# Patient Record
(demographics unavailable — no encounter records)

---

## 2025-07-09 NOTE — HISTORY OF PRESENT ILLNESS
[No Feeding Issues] : no feeding issues at this time [Solid Foods] : eating solid foods [de-identified] : Maureen is presenting today for initial outpatient evaluation of pancytopenia  In early June he was dx with lymphadenitis and pharyngitis and completed a 10d course of amoxicillin He began having fevers 10days post-amox on 6/25 and his PCP sent him to [facility]. In the ER he was noted to be pancytopenic WBC 2.71 RBC 4.07 Hgb 10.6 plts 125K retic 0.5%.  Admitted on 6/26 due to febrile pancytopenia. While inpatient UA neg, RVP neg, parvo neg, EBV neg, CMV neg, BCX neg, malaria neg. Started on IV cefepime d/t febrile neutropenia. Clean catch urine positive for 10K-49K e.coli, no additional abx added. No differential done initially, but ANC noted to be 900 on 6/29, by this time plts had recovered to 176K but Hgb, RBC, WBC were all still low. Had a US of the head/neck d/t palpable left cervical lymph node and was noted be enlarged with normal morphology. Flow cytometry sent on peripheral blood, showed T cells with normal CD4 to CD8 ratio and polytypic B cells with no increase in myeloid immaturity with normal myeloid antigen maturation pattern. Flow also showed mild pancytopenia and neutropenia with no significant absolute lymphocytosis or monocytosis. Prior to d/c his counts were Hgb 10.6, RBC 3.99, WBC 2.34, plts 171K, .   Mom says Maureen had a very similar event occur in 2019. He had pharyngitis and took amoxicillin for 10 days, then about a week later he developed fevers and had to go to the ER. She does not remember if he was pancytopenic then, but says he had a lot of tests and they were all negative. This was at [facility].   Historically, other than these two events, he has not required hospitalization for infxn or multiple rounds of abx for infxn/illness. He did not have omphalitis as a baby. He does not get sick very frequently. He has not had any previously known cytopenias.  As of today's appt post-d/c he has not had any fevers, URIs/s, n/v/d. He does say he is intermittently fatigued but is feeling much better overall. No acute c/o.

## 2025-07-09 NOTE — PAST MEDICAL HISTORY
[At Term] : at term [place] [Normal Vaginal Route] : by normal vaginal route [None] : there were no delivery complications [Age Appropriate] : age appropriate  [In Vitro Fertilization] : Pregnancy no in vitro fertilization [Jaundice] : not jaundice [Phototherapy] : no phototherapy [Transfusion] : no transfusion [Exchange Transfusion] : no exchange transfusion [NICU] : no NICU

## 2025-07-09 NOTE — PHYSICAL EXAM
[Cervical Lymph Nodes Enlarged Posterior Bilaterally] : posterior cervical [Supraclavicular Lymph Nodes Enlarged Bilaterally] : supraclavicular [No focal deficits] : no focal deficits [Gait normal] : gait normal [PERRLA] : CHIDI [Normal] : affect appropriate [de-identified] : one palpable <1cm mobile LN left ant cervical [de-identified] : eczema to right antecubital fossa, otherwise no rashes, nodules, vesicles

## 2025-07-09 NOTE — REVIEW OF SYSTEMS
[Immunizations are up to date by report] : Immunizations are up to date by report [Fatigue] : fatigue [Negative] : Psychiatric [Fever] : no fever [Chills] : no chills [Sweating] : no sweating [Decreased Appetite] : normal appetite [Petechiae] : no petechiae [Ecchymoses] : no ecchymoses [Jaundice] : no jaundice [de-identified] : eczema

## 2025-07-09 NOTE — PHYSICAL EXAM
[Cervical Lymph Nodes Enlarged Posterior Bilaterally] : posterior cervical [Supraclavicular Lymph Nodes Enlarged Bilaterally] : supraclavicular [No focal deficits] : no focal deficits [Gait normal] : gait normal [PERRLA] : CHIDI [Normal] : affect appropriate [de-identified] : one palpable <1cm mobile LN left ant cervical [de-identified] : eczema to right antecubital fossa, otherwise no rashes, nodules, vesicles

## 2025-07-09 NOTE — RESULTS/DATA
[FreeTextEntry1] : Peripheral smear reviewed with Dr. Canada, RBC: normal morphology, WBC: normal morphology, Plts: normal morphology.

## 2025-07-09 NOTE — REASON FOR VISIT
[New Patient/Consultation] : a new patient/consultation for [Medical Records] : medical records [Patient] : patient [Mother] : mother [FreeTextEntry2] : Pancytopenia

## 2025-07-09 NOTE — HISTORY OF PRESENT ILLNESS
[No Feeding Issues] : no feeding issues at this time [Solid Foods] : eating solid foods [de-identified] : Maureen is presenting today for initial outpatient evaluation of pancytopenia  In early June he was dx with lymphadenitis and pharyngitis and completed a 10d course of amoxicillin He began having fevers 10days post-amox on 6/25 and his PCP sent him to [facility]. In the ER he was noted to be pancytopenic WBC 2.71 RBC 4.07 Hgb 10.6 plts 125K retic 0.5%.  Admitted on 6/26 due to febrile pancytopenia. While inpatient UA neg, RVP neg, parvo neg, EBV neg, CMV neg, BCX neg, malaria neg. Started on IV cefepime d/t febrile neutropenia. Clean catch urine positive for 10K-49K e.coli, no additional abx added. No differential done initially, but ANC noted to be 900 on 6/29, by this time plts had recovered to 176K but Hgb, RBC, WBC were all still low. Had a US of the head/neck d/t palpable left cervical lymph node and was noted be enlarged with normal morphology. Flow cytometry sent on peripheral blood, showed T cells with normal CD4 to CD8 ratio and polytypic B cells with no increase in myeloid immaturity with normal myeloid antigen maturation pattern. Flow also showed mild pancytopenia and neutropenia with no significant absolute lymphocytosis or monocytosis. Prior to d/c his counts were Hgb 10.6, RBC 3.99, WBC 2.34, plts 171K, .   Mom says Maureen had a very similar event occur in 2019. He had pharyngitis and took amoxicillin for 10 days, then about a week later he developed fevers and had to go to the ER. She does not remember if he was pancytopenic then, but says he had a lot of tests and they were all negative. This was at [facility].   Historically, other than these two events, he has not required hospitalization for infxn or multiple rounds of abx for infxn/illness. He did not have omphalitis as a baby. He does not get sick very frequently. He has not had any previously known cytopenias.  As of today's appt post-d/c he has not had any fevers, URIs/s, n/v/d. He does say he is intermittently fatigued but is feeling much better overall. No acute c/o.

## 2025-07-09 NOTE — SOCIAL HISTORY
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

## 2025-07-22 NOTE — DATA REVIEWED
[FreeTextEntry1] : 1. history obtained from primary caregiver as independent historian due to patient's developmental age and limited recall 2. peds heme on c note reviewed 3. CBC reviewed

## 2025-07-22 NOTE — PHYSICAL EXAM
[Normal Gait and Station] : normal gait and station [Normal muscle strength, symmetry and tone of facial, head and neck musculature] : normal muscle strength, symmetry and tone of facial, head and neck musculature [Normal] : no cervical lymphadenopathy [Partial] : partial cerumen impaction [Exposed Vessel] : left anterior vessel not exposed [Increased Work of Breathing] : no increased work of breathing with use of accessory muscles and retractions

## 2025-07-22 NOTE — HISTORY OF PRESENT ILLNESS
[Snoring] : snoring [No Personal or Family History of Easy Bruising, Bleeding, or Issues with General Anesthesia] : No Personal or Family History of easy bruising, bleeding, or issues with general anesthesia [de-identified] : 13 year old M here with Mom, referred by PMD Dr. Hussein for snoring  Was seen at outside ENT (mom unsure who) Wants to switch to [facility]  Prescribed Flonase, started in April, uses every night Has seen improvement since starting  Snoring has improved, no witnessed pausing, choking, or gasping  No previous PSG  Mouth breather intermittently No enuresis  No ADHD or hyperactive behavior  No headaches  Chronic nasal congestion has improved since starting flonase  Has been previously allergy tested  No recent ear infections  No recent throat infections  No hearing or speech concerns   Passed [facility]

## 2025-07-22 NOTE — REVIEW OF SYSTEMS
[Negative] : Heme/Lymph [de-identified] : as per HPI  [de-identified] : as per HPI  [de-identified] : as per HPI

## 2025-07-25 NOTE — HISTORY OF PRESENT ILLNESS
[No Feeding Issues] : no feeding issues at this time [Solid Foods] : eating solid foods [de-identified] : Maureen is presenting today for initial outpatient evaluation of pancytopenia  In early June he was dx with lymphadenitis and pharyngitis and completed a 10d course of amoxicillin He began having fevers 10days post-amox on 6/25 and his PCP sent him to [facility]. In the ER he was noted to be pancytopenic WBC 2.71 RBC 4.07 Hgb 10.6 plts 125K retic 0.5%.  Admitted on 6/26 due to febrile pancytopenia. While inpatient UA neg, RVP neg, parvo neg, EBV neg, CMV neg, BCX neg, malaria neg. Started on IV cefepime d/t febrile neutropenia. Clean catch urine positive for 10K-49K e.coli, no additional abx added. No differential done initially, but ANC noted to be 900 on 6/29, by this time plts had recovered to 176K but Hgb, RBC, WBC were all still low. Had a US of the head/neck d/t palpable left cervical lymph node and was noted be enlarged with normal morphology. Flow cytometry sent on peripheral blood, showed T cells with normal CD4 to CD8 ratio and polytypic B cells with no increase in myeloid immaturity with normal myeloid antigen maturation pattern. Flow also showed mild pancytopenia and neutropenia with no significant absolute lymphocytosis or monocytosis. Prior to d/c his counts were Hgb 10.6, RBC 3.99, WBC 2.34, plts 171K, .   Mom says Maureen had a very similar event occur in 2019. He had pharyngitis and took amoxicillin for 10 days, then about a week later he developed fevers and had to go to the ER. She does not remember if he was pancytopenic then, but says he had a lot of tests and they were all negative. This was at [facility].   Historically, other than these two events, he has not required hospitalization for infxn or multiple rounds of abx for infxn/illness. He did not have omphalitis as a baby. He does not get sick very frequently. He has not had any previously known cytopenias.  As of today's appt post-d/c he has not had any fevers, URIs/s, n/v/d. He does say he is intermittently fatigued but is feeling much better overall. No acute c/o. [de-identified] : 13-year-old male presents today with fever of 101F and swelling of right cervical lymph nodes. Patient denies nausea, vomiting, diarrhea, or chills. No recent upper respiratory infection reported, though he endorses mild sore throat. Appetite remains normal, and he is eating and drinking well. Patient was recently hospitalized for pancytopenia with lymphadenitis. During hospitalization, he was treated with cefepime and discharged on clindamycin. He initially responded well to clindamycin with resolution of cervical adenopathy.

## 2025-07-25 NOTE — REASON FOR VISIT
[Sick Visit] : a sick visit for [Patient] : patient [Mother] : mother [Medical Records] : medical records [FreeTextEntry2] : fever and lymphadenitis

## 2025-07-25 NOTE — REVIEW OF SYSTEMS
[Fatigue] : fatigue [Negative] : Psychiatric [Immunizations are up to date by report] : Immunizations are up to date by report [Fever] : fever [Adenopathy] : adenopathy [Chills] : no chills [Sweating] : no sweating [Decreased Appetite] : normal appetite [Petechiae] : no petechiae [Ecchymoses] : no ecchymoses [Jaundice] : no jaundice [de-identified] : eczema [FreeTextEntry1] : right neck

## 2025-07-25 NOTE — REVIEW OF SYSTEMS
[Fatigue] : fatigue [Negative] : Psychiatric [Immunizations are up to date by report] : Immunizations are up to date by report [Fever] : fever [Adenopathy] : adenopathy [Chills] : no chills [Sweating] : no sweating [Decreased Appetite] : normal appetite [Petechiae] : no petechiae [Ecchymoses] : no ecchymoses [Jaundice] : no jaundice [de-identified] : eczema [FreeTextEntry1] : right neck

## 2025-07-25 NOTE — HISTORY OF PRESENT ILLNESS
[No Feeding Issues] : no feeding issues at this time [Solid Foods] : eating solid foods [de-identified] : Maureen is presenting today for initial outpatient evaluation of pancytopenia  In early June he was dx with lymphadenitis and pharyngitis and completed a 10d course of amoxicillin He began having fevers 10days post-amox on 6/25 and his PCP sent him to [facility]. In the ER he was noted to be pancytopenic WBC 2.71 RBC 4.07 Hgb 10.6 plts 125K retic 0.5%.  Admitted on 6/26 due to febrile pancytopenia. While inpatient UA neg, RVP neg, parvo neg, EBV neg, CMV neg, BCX neg, malaria neg. Started on IV cefepime d/t febrile neutropenia. Clean catch urine positive for 10K-49K e.coli, no additional abx added. No differential done initially, but ANC noted to be 900 on 6/29, by this time plts had recovered to 176K but Hgb, RBC, WBC were all still low. Had a US of the head/neck d/t palpable left cervical lymph node and was noted be enlarged with normal morphology. Flow cytometry sent on peripheral blood, showed T cells with normal CD4 to CD8 ratio and polytypic B cells with no increase in myeloid immaturity with normal myeloid antigen maturation pattern. Flow also showed mild pancytopenia and neutropenia with no significant absolute lymphocytosis or monocytosis. Prior to d/c his counts were Hgb 10.6, RBC 3.99, WBC 2.34, plts 171K, .   Mom says Maureen had a very similar event occur in 2019. He had pharyngitis and took amoxicillin for 10 days, then about a week later he developed fevers and had to go to the ER. She does not remember if he was pancytopenic then, but says he had a lot of tests and they were all negative. This was at [facility].   Historically, other than these two events, he has not required hospitalization for infxn or multiple rounds of abx for infxn/illness. He did not have omphalitis as a baby. He does not get sick very frequently. He has not had any previously known cytopenias.  As of today's appt post-d/c he has not had any fevers, URIs/s, n/v/d. He does say he is intermittently fatigued but is feeling much better overall. No acute c/o. [de-identified] : 13-year-old male presents today with fever of 101F and swelling of right cervical lymph nodes. Patient denies nausea, vomiting, diarrhea, or chills. No recent upper respiratory infection reported, though he endorses mild sore throat. Appetite remains normal, and he is eating and drinking well. Patient was recently hospitalized for pancytopenia with lymphadenitis. During hospitalization, he was treated with cefepime and discharged on clindamycin. He initially responded well to clindamycin with resolution of cervical adenopathy.

## 2025-07-25 NOTE — PHYSICAL EXAM
[Cervical Lymph Nodes Enlarged Posterior Bilaterally] : posterior cervical [Supraclavicular Lymph Nodes Enlarged Bilaterally] : supraclavicular [No focal deficits] : no focal deficits [Gait normal] : gait normal [PERRLA] : CHIDI [Normal] : affect appropriate [de-identified] : Mild pharyngeal erythema, no exudate. Right cervical lymphadenopathy  [de-identified] : right cerviacl lyphadenopathy [de-identified] : one palpable <1cm mobile LN left ant cervical [de-identified] : eczema to right antecubital fossa, otherwise no rashes, nodules, vesicles

## 2025-07-25 NOTE — PHYSICAL EXAM
[Cervical Lymph Nodes Enlarged Posterior Bilaterally] : posterior cervical [Supraclavicular Lymph Nodes Enlarged Bilaterally] : supraclavicular [No focal deficits] : no focal deficits [Gait normal] : gait normal [PERRLA] : CHIDI [Normal] : affect appropriate [de-identified] : Mild pharyngeal erythema, no exudate. Right cervical lymphadenopathy  [de-identified] : right cerviacl lyphadenopathy [de-identified] : one palpable <1cm mobile LN left ant cervical [de-identified] : eczema to right antecubital fossa, otherwise no rashes, nodules, vesicles